# Patient Record
Sex: MALE | Race: WHITE | NOT HISPANIC OR LATINO | Employment: OTHER | URBAN - METROPOLITAN AREA
[De-identification: names, ages, dates, MRNs, and addresses within clinical notes are randomized per-mention and may not be internally consistent; named-entity substitution may affect disease eponyms.]

---

## 2017-10-13 DIAGNOSIS — I65.22 OCCLUSION AND STENOSIS OF LEFT CAROTID ARTERY: ICD-10-CM

## 2018-01-12 ENCOUNTER — GENERIC CONVERSION - ENCOUNTER (OUTPATIENT)
Dept: OTHER | Facility: OTHER | Age: 73
End: 2018-01-12

## 2018-01-12 NOTE — PROCEDURES
Procedures by Ion Cerna MD  at 11/29/2016  3:50 PM      Author:  Ion Cerna MD Service:  Cardiology Author Type:  Physician     Filed:  11/29/2016  3:52 PM Date of Service:  11/29/2016  3:50 PM Status:  Signed     :  Ion Cerna MD (Physician)         Pre-procedure Diagnoses:       1  Aortic stenosis [I35 0]                Post-procedure Diagnoses:       1  Aortic stenosis, moderate [I35 0]                Procedures:       1  OK ECHO HEART,TRANSESOPHAGEAL,COMPLETE [15324 (CPT®)]                   Patient was brought to OR for anesthesia  Probe inserted and images acquired  Moderate Aortic Stenosis in the setting of severe LV dysfunction noted  Full report to follow  No complications  Blanca NOONAN    Nov 29 2016  3:53PM [facility] Standard Time

## 2018-01-15 NOTE — PROCEDURES
Procedures by Lisa Coleman PA-C at  12/9/2016  1:48 PM      Author:  Lisa Coleman PA-C Service:  Critical Care/ICU Author Type:  Physician Assistant    Filed:  12/9/2016  1:49 PM Date of Service:  12/9/2016  1:48 PM Status:  Signed    :  Lisa Coleman PA-C (Physician Assistant)  Cosigner:  Hasmukh Mariscal MD at 12/9/2016  4:52 PM      Procedures:       1  CHEST TUBE REMOVAL [SLA203 (Custom)]                   Procedure: Chest Tube Removal    12/09/16    Mediastinal CT x 2 and Bblateral pleural CT x 2 d/c'd in typical fashion  Patient tolerated procedure well  No immediate complications  Site dressed with xeroform and dry sterile dressing  Patient's  nurse was made aware       Lisa Coleman PA-C               Received for:Provider  [facility]   Dec  9 2016  4:53PM Cathy Amaya Standard Time

## 2018-01-17 NOTE — PROCEDURES
Procedures by Vida Hernandez MD at 12/9/2016  12:30 PM      Author:  Vida Hernandez MD Service:  Electrophysiology Author Type:  Physician     Filed:  12/9/2016  1:19 PM Date of Service:  12/9/2016 12:30 PM Status:  Signed     :  Vida Hernandez MD (Physician)            PP SINGLE CHAMBER    History and physical were reviewed  Patient was examined and history was reviewed  No change in patient's condition Since history and physical has been completed      Pre- operative diagnosis:  Dual chamber PPM, LV epicardial lead      Postoperative diagnosis:  Dual chamber PPM, LV epicardial lead      Procedure:  Upgraded to BIV PPM generator and all 3 leads ( endocardial RA and RV, epicardial LV) are connected and functioning      Surgeon: [address] [place] [address] Drive -none    Specimens - none    Estimated blood loss- 10 ml    Findings-none    Complications none    Anesthesia-  Anesthesia by anaesthesiologist , local lidocaine by myself      Details of the device        Description of procedure: The patient was seen before the procedure  The details of the procedure was explained and patient agreed to the same  Appropriate consent was signed  The patient was brought to the electrophysiologic laboratory  Proper time out was done  Sterile dressing and draping was done  Local lidocaine was infiltrated, In the right infraclavicular region at the site of device implant  Initial incision was made by a sharp blade  Thereafter plasma blade  and blunt dissection was used to open up the pocket  Care was taken not to injure the leads  Appropriate hemostasis was taken care of      LV epicardial lead was identified and hooked on to new generator  The RV and RA leads were disconnected from the old generator, wiped clean, and quickly attached to the new generator  The pocket was washed with large amounts of antibiotic saline  Appropriate hemostasis was taken care of       The pocket was closed in 3 separate layers with intermittent sutures  Dressing was done with Steri-Strips, Telfa and Tegaderm        Summary of the procedure: The patient came in to the laboratory for generator upgrade  The old generator has been removed and a new device has been placed  The patient tolerated the procedure well                         Omkar NOONAN    Dec  9 2016  1:20PM [facility] Standard Time

## 2018-02-26 NOTE — MISCELLANEOUS
Message  Spoke to patient today, he states that he is following with the [address] [facility] for his care  He stated that he is seeing a doctor that is monitoring his pacemaker but he does not know his name  Patient states that he is doing well  Active Problems    1  CAD (coronary artery disease) (414 00) (I25 10)   2  Cardiomyopathy (425 4) (I42 9)   3  Chronic kidney disease, unspecified stage   4  Diabetes mellitus (250 00) (E11 9)   5  Hypertension (401 9) (I10)   6  Stenosis of left carotid artery (433 10) (I65 22)    Current Meds   1  Acetaminophen 325 MG Oral Tablet; Therapy: (Recorded:05Jan2017) to Recorded   2  Albuterol Sulfate 108 (90 Base) MCG/ACT AERS; Therapy: (Recorded:05Jan2017) to Recorded   3  AmLODIPine Besylate 2 5 MG Oral Tablet; Therapy: (Recorded:05Jan2017) to Recorded   4  Aspirin 81 MG Oral Tablet Chewable; Therapy: (Recorded:05Jan2017) to Recorded   5  Atorvastatin Calcium 80 MG Oral Tablet; Therapy: (Recorded:05Jan2017) to Recorded   6  Clopidogrel Bisulfate 75 MG Oral Tablet; Therapy: (Recorded:05Jan2017) to Recorded   7  Docusate Sodium 100 MG Oral Capsule; Therapy: (Recorded:05Jan2017) to Recorded   8  Finasteride 5 MG Oral Tablet; Therapy: (Recorded:05Jan2017) to Recorded   9  Glimepiride 2 MG Oral Tablet; Therapy: (Recorded:05Jan2017) to Recorded   10  Metoprolol Succinate ER 50 MG Oral Tablet Extended Release 24 Hour; Therapy: (Recorded:05Jan2017) to Recorded   11  Nystatin 220229 UNIT/GM External Cream;    Therapy: (Recorded:05Jan2017) to Recorded   12  Oxycodone-Acetaminophen 5-325 MG Oral Tablet; Therapy: (Recorded:05Jan2017) to Recorded   13  Pantoprazole Sodium 40 MG Oral Tablet Delayed Release; Therapy: (Recorded:05Jan2017) to Recorded   14  Polyethylene Glycol Powder; Therapy: (Recorded:05Jan2017) to Recorded   15  Tamsulosin HCl - 0 4 MG Oral Capsule; Therapy: (Recorded:05Jan2017) to Recorded    Allergies    1   No Known Drug Allergies    Signatures Electronically signed by : Vicki Serrano, ; Jan 12 2018  9:38AM EST                       (Author)

## 2018-03-07 NOTE — PROGRESS NOTES
"  Discussion/Summary  Normal device function     AMS and noise reversion during surgery - CEA    otherwise well functioning device  Results/Data  Cardiac Device In Clinic [id number] 07:59PM Sammi Cruz     Test Name Result Flag Reference   MISCELLANEOUS COMMENT (Report)     DEVICE INTERROGATED IN THE [facility] OFFICE  BATTERY VOLTAGE ADEQUATE (2 2 YRS)  AP-69%, BVP>99%  2 DEVICE CLASSIFIED AMS & 1 NOISE REVERSION DURING TIME OF SURGERY (CEA)  NO SIGNIFICANT HIGH RATE EPISODES  ALL LEAD PARAMETERS WITHIN NORMAL LIMITS  ALL OTHER TESTING WITHIN NORMAL LIMITS  NORMAL DEVICE FUNCTION  WOUND CHECK: INCISION CLEAN AND DRY WITH EDGES APPROXIMATED; STERISTRIPS REMOVED; WOUND CARE AND RESTRICTIONS REVIEWED WITH PATIENT  GV   Cardiac Electrophysiology Report      [id number]  pdf   DEVICE TYPE CRT-P       Cardiac Electrophysiology Report [id number] 07:59PM Sammi Cruz     Test Name Result Flag Reference   Cardiac Electrophysiology Report      [id number]  pdf     Cardiac Electrophysiology Report [id number] 07:59PM Sammi Cruz     Test Name Result Flag Reference   Cardiac Electrophysiology Report      [id number]  pdf     Signatures   Electronically signed by : Cy Szymanski RN; Dec 29 2016  4:03PM EST                       (Author)    Electronically signed by : NEHEMIAH Newton ; Dec 31 2016  8:16AM EST                       (Author)    "

## 2018-06-12 ENCOUNTER — TELEPHONE (OUTPATIENT)
Dept: ADMINISTRATIVE | Facility: HOSPITAL | Age: 73
End: 2018-06-12

## 2018-06-12 NOTE — TELEPHONE ENCOUNTER
CEA Long Term Follow-up ----------------        Date of Phone Call: 2018    Verify Patient Name: Monique Trammell    Verify Patient : 1945    Date of CEA Surgery: 2016    CEA Surgery Side (Right or Left): Left    General Information---------------------    Current Smoking (Yes or No): No    Current Living Status (Home, Nursing Home, Dead, Homeless): Home    If Death, Date of Death, or N/A: N/A    Cause of Death (Operation Related,Non-Related,Unsure): N/A    Current Medications: -------------------------------    Aspirin (Yes, No, No - for Medical Reason, Non-compliant): No    Anticoagulant (None, Coumadin/Warfarin, Pradaxa/Dabigatran,                                   Xarelto/ Rivaroxaban, Other, No - for Medical Reason, Non-compliant): No    Statin (Yes, No, No - for Medical Reason, Non-compliant): Yes    P2Y12 Antagonist (None, Clopidogrel/Plavix, Effient/Prasugrel,  Ticlidopine/Ticlid, Ticagrelor/Brilinta, Other, No - for Medial Reason, Non-compliant): Yes, Plavix  Beta Blocker (Yes, No, No - for Medical Reason, Non-compliant): Yes    ACE Inhibitor/ARB (Yes, No, No - for Medical Reason, Non-compliant): No    Carotid Endarterectomy----------------------    Neurologic Event Since Discharge (Yes or No): No    TIA Since Discharge(Yes or No):  No       If Yes, Date of TIA ( If known): N/A  Stroke Since Discharge (Yes or No): No  If Yes, Date of Stroke (If Known): N/A    List Neuro Deficits if had Stroke: N/A  (Right/Left Sided Weakness or Unable to Use Right/Left Side; Speech difficulty or Not Able to Speak; Balance or Dizziness; Loss of, or Blurred Vision; or Other)    Modified Hampton Score ( 0, 1, 2, 3, 4, or 5): 1  0=No symptoms at all  1=No significant disability despite symptoms; able to carry out all usual duties and activities  2= Slight disability; unable to carry out all previous activities, but able to look after own affairs without assistance    3=Moderate disability; requiring some help, but able to walk without assistance  4=Moderately severe disability; unable to walk without assistance and unable to attend to own bodily needs without assistance  5=Severe disability; bedridden, incontinent and requiring constant nursing care and attention    MI since Discharge ( Yes or No ): No    Cranial Nerve Injury ( None, Resolved, Persistant ): None    Duplex of CEA Side Since Surgery ( Yes or No ): No  PSV (cm/sec):  EDV (cm/sec):  ICA/CCA Ratio:  Duplex Stenosis ( < 50%, > 50%, > 60%, > 70%, > 80%, Occluded):    CEA Site Re-operation ( Yes or No): No          If Yes, Date of Re-op: N/A    CEA Site PTA/Stent ( Yes or No): No           If Yes, Date of PTA/Stent: N/A    Thank You      Telephone Interview Done By: Esme Billings RN

## 2018-06-13 ENCOUNTER — TELEPHONE (OUTPATIENT)
Dept: ADMINISTRATIVE | Facility: HOSPITAL | Age: 73
End: 2018-06-13